# Patient Record
Sex: FEMALE | Race: WHITE | Employment: UNEMPLOYED | URBAN - NONMETROPOLITAN AREA
[De-identification: names, ages, dates, MRNs, and addresses within clinical notes are randomized per-mention and may not be internally consistent; named-entity substitution may affect disease eponyms.]

---

## 2017-01-23 RX ORDER — DIVALPROEX SODIUM 500 MG/1
TABLET, EXTENDED RELEASE ORAL
Qty: 60 TABLET | Refills: 0 | Status: SHIPPED | OUTPATIENT
Start: 2017-01-23 | End: 2017-03-10 | Stop reason: SDUPTHER

## 2017-03-27 RX ORDER — DIVALPROEX SODIUM 500 MG/1
TABLET, EXTENDED RELEASE ORAL
Qty: 60 TABLET | Refills: 0 | Status: SHIPPED | OUTPATIENT
Start: 2017-03-27 | End: 2017-04-20 | Stop reason: SDUPTHER

## 2017-04-20 ENCOUNTER — OFFICE VISIT (OUTPATIENT)
Dept: NEUROLOGY | Age: 37
End: 2017-04-20
Payer: MEDICARE

## 2017-04-20 VITALS
HEIGHT: 64 IN | BODY MASS INDEX: 36.37 KG/M2 | SYSTOLIC BLOOD PRESSURE: 122 MMHG | WEIGHT: 213 LBS | HEART RATE: 84 BPM | DIASTOLIC BLOOD PRESSURE: 78 MMHG

## 2017-04-20 DIAGNOSIS — Z82.0 FH: EPILEPSY: ICD-10-CM

## 2017-04-20 DIAGNOSIS — G40.909 NONINTRACTABLE EPILEPSY WITHOUT STATUS EPILEPTICUS, UNSPECIFIED EPILEPSY TYPE (HCC): ICD-10-CM

## 2017-04-20 DIAGNOSIS — G40.909 SEIZURE DISORDER (HCC): Primary | ICD-10-CM

## 2017-04-20 DIAGNOSIS — Z87.828 OLD HEAD INJURY: ICD-10-CM

## 2017-04-20 DIAGNOSIS — F41.9 ANXIETY: ICD-10-CM

## 2017-04-20 DIAGNOSIS — R94.01 ABNORMAL EEG: ICD-10-CM

## 2017-04-20 DIAGNOSIS — M54.2 CHRONIC NECK PAIN: ICD-10-CM

## 2017-04-20 DIAGNOSIS — R41.3 MEMORY PROBLEM: ICD-10-CM

## 2017-04-20 DIAGNOSIS — R26.89 BALANCE PROBLEM: ICD-10-CM

## 2017-04-20 DIAGNOSIS — J98.4 LUNG DISEASE: ICD-10-CM

## 2017-04-20 DIAGNOSIS — E04.1 THYROID NODULE: ICD-10-CM

## 2017-04-20 DIAGNOSIS — G40.822 NONINTRACTABLE EPILEPTIC SPASMS WITHOUT STATUS EPILEPTICUS (HCC): ICD-10-CM

## 2017-04-20 DIAGNOSIS — F41.0 PANIC DISORDER: ICD-10-CM

## 2017-04-20 DIAGNOSIS — E66.09 NON MORBID OBESITY DUE TO EXCESS CALORIES: ICD-10-CM

## 2017-04-20 DIAGNOSIS — F32.A DEPRESSION, UNSPECIFIED DEPRESSION TYPE: ICD-10-CM

## 2017-04-20 DIAGNOSIS — G89.29 CHRONIC NECK PAIN: ICD-10-CM

## 2017-04-20 DIAGNOSIS — M62.838 MUSCLE SPASMS OF BOTH LOWER EXTREMITIES: ICD-10-CM

## 2017-04-20 DIAGNOSIS — R55 SYNCOPE AND COLLAPSE: ICD-10-CM

## 2017-04-20 PROCEDURE — 99215 OFFICE O/P EST HI 40 MIN: CPT | Performed by: PSYCHIATRY & NEUROLOGY

## 2017-04-20 RX ORDER — ONDANSETRON 4 MG/1
4 TABLET, FILM COATED ORAL
COMMUNITY

## 2017-04-20 RX ORDER — TRAMADOL HYDROCHLORIDE 50 MG/1
50 TABLET ORAL
COMMUNITY

## 2017-04-20 RX ORDER — DULOXETIN HYDROCHLORIDE 20 MG/1
20 CAPSULE, DELAYED RELEASE ORAL
COMMUNITY
Start: 2016-05-20 | End: 2017-05-20

## 2017-04-20 RX ORDER — CYCLOBENZAPRINE HCL 10 MG
10 TABLET ORAL
COMMUNITY

## 2017-04-20 RX ORDER — DIVALPROEX SODIUM 500 MG/1
TABLET, EXTENDED RELEASE ORAL
Qty: 60 TABLET | Refills: 5 | Status: SHIPPED | OUTPATIENT
Start: 2017-04-20 | End: 2018-01-22 | Stop reason: SDUPTHER

## 2017-04-20 RX ORDER — VENLAFAXINE HYDROCHLORIDE 150 MG/1
150 CAPSULE, EXTENDED RELEASE ORAL
COMMUNITY

## 2017-04-20 RX ORDER — ALBUTEROL SULFATE 90 UG/1
2 AEROSOL, METERED RESPIRATORY (INHALATION)
COMMUNITY

## 2017-04-20 RX ORDER — HYDROXYZINE HYDROCHLORIDE 25 MG/1
25 TABLET, FILM COATED ORAL
COMMUNITY

## 2017-04-20 ASSESSMENT — ENCOUNTER SYMPTOMS
CHOKING: 0
APNEA: 0
COLOR CHANGE: 0
ABDOMINAL DISTENTION: 0
CONSTIPATION: 0
BLOOD IN STOOL: 0
EYE ITCHING: 0
CHEST TIGHTNESS: 0
WHEEZING: 0
SINUS PRESSURE: 0
EYE REDNESS: 0
SHORTNESS OF BREATH: 0
PHOTOPHOBIA: 0
VOICE CHANGE: 0
DIARRHEA: 0
EYE PAIN: 0
FACIAL SWELLING: 0
BACK PAIN: 1
TROUBLE SWALLOWING: 0
EYE DISCHARGE: 0

## 2018-01-22 ENCOUNTER — OFFICE VISIT (OUTPATIENT)
Dept: NEUROLOGY | Age: 38
End: 2018-01-22
Payer: MEDICAID

## 2018-01-22 VITALS
BODY MASS INDEX: 36.36 KG/M2 | HEIGHT: 64 IN | SYSTOLIC BLOOD PRESSURE: 130 MMHG | WEIGHT: 212.96 LBS | DIASTOLIC BLOOD PRESSURE: 70 MMHG | OXYGEN SATURATION: 98 % | HEART RATE: 96 BPM

## 2018-01-22 DIAGNOSIS — F41.9 ANXIETY: ICD-10-CM

## 2018-01-22 DIAGNOSIS — Z87.828 OLD HEAD INJURY: ICD-10-CM

## 2018-01-22 DIAGNOSIS — Z82.0 FH: EPILEPSY: ICD-10-CM

## 2018-01-22 DIAGNOSIS — F32.A DEPRESSION, UNSPECIFIED DEPRESSION TYPE: ICD-10-CM

## 2018-01-22 DIAGNOSIS — G40.909 SEIZURE DISORDER (HCC): ICD-10-CM

## 2018-01-22 DIAGNOSIS — F41.0 PANIC DISORDER: ICD-10-CM

## 2018-01-22 DIAGNOSIS — R94.01 ABNORMAL EEG: ICD-10-CM

## 2018-01-22 DIAGNOSIS — M62.838 MUSCLE SPASMS OF BOTH LOWER EXTREMITIES: ICD-10-CM

## 2018-01-22 DIAGNOSIS — G89.29 CHRONIC NECK PAIN: ICD-10-CM

## 2018-01-22 DIAGNOSIS — G40.309 NONINTRACTABLE GENERALIZED IDIOPATHIC EPILEPSY WITHOUT STATUS EPILEPTICUS (HCC): Primary | ICD-10-CM

## 2018-01-22 DIAGNOSIS — M54.2 CHRONIC NECK PAIN: ICD-10-CM

## 2018-01-22 DIAGNOSIS — E04.1 THYROID NODULE: ICD-10-CM

## 2018-01-22 DIAGNOSIS — R41.3 MEMORY PROBLEM: ICD-10-CM

## 2018-01-22 DIAGNOSIS — E66.09 NON MORBID OBESITY DUE TO EXCESS CALORIES: ICD-10-CM

## 2018-01-22 DIAGNOSIS — R26.89 BALANCE PROBLEM: ICD-10-CM

## 2018-01-22 DIAGNOSIS — E66.09 CLASS 1 OBESITY DUE TO EXCESS CALORIES WITH SERIOUS COMORBIDITY IN ADULT, UNSPECIFIED BMI: ICD-10-CM

## 2018-01-22 PROCEDURE — 99215 OFFICE O/P EST HI 40 MIN: CPT | Performed by: PSYCHIATRY & NEUROLOGY

## 2018-01-22 RX ORDER — DIVALPROEX SODIUM 500 MG/1
TABLET, EXTENDED RELEASE ORAL
Qty: 60 TABLET | Refills: 5 | Status: SHIPPED | OUTPATIENT
Start: 2018-01-22

## 2018-01-22 ASSESSMENT — ENCOUNTER SYMPTOMS
EYE REDNESS: 0
SINUS PRESSURE: 0
WHEEZING: 0
VOMITING: 0
CHEST TIGHTNESS: 0
PHOTOPHOBIA: 0
SHORTNESS OF BREATH: 0
SWOLLEN GLANDS: 0
VISUAL CHANGE: 0
EYE DISCHARGE: 0
ABDOMINAL PAIN: 0
COLOR CHANGE: 0
BLOOD IN STOOL: 0
TROUBLE SWALLOWING: 0
CHANGE IN BOWEL HABIT: 0
FACIAL SWELLING: 0
CONSTIPATION: 0
EYE PAIN: 0
BACK PAIN: 1
EYE ITCHING: 0
COUGH: 0
NAUSEA: 0
APNEA: 0
CHOKING: 0
SORE THROAT: 0
DIARRHEA: 0
ABDOMINAL DISTENTION: 0
VOICE CHANGE: 0

## 2018-01-22 NOTE — PROGRESS NOTES
records         INFORMATION   REVIEWED:     MEDICAL   HISTORY,     SURGICAL   HISTORY,   MEDICATIONS   LIST,   ALLERGIES AND  DRUG  INTOLERANCES,     FAMILY   HISTORY,  SOCIAL  HISTORY,    PROBLEM  LIST   FOR  PATIENT  CARE   COORDINATION         Past Medical History:   Diagnosis Date    Anxiety     Chronic neck pain     Depression     Lung disease     Panic disorder     Thyroid nodule                 History reviewed. No pertinent surgical history.               Current Outpatient Prescriptions   Medication Sig Dispense Refill    divalproex (DEPAKOTE ER) 500 MG extended release tablet TAKE ONE TABLET BY MOUTH TWICE DAILY 60 tablet 5    albuterol sulfate  (90 BASE) MCG/ACT inhaler Inhale 2 puffs into the lungs      venlafaxine (EFFEXOR XR) 150 MG extended release capsule Take 150 mg by mouth      fluticasone (FLOVENT HFA) 110 MCG/ACT inhaler Inhale 1 puff into the lungs 2 times daily      levothyroxine (SYNTHROID) 50 MCG tablet Take 50 mcg by mouth Daily      meloxicam (MOBIC) 15 MG tablet Take 15 mg by mouth daily      etonogestrel (NEXPLANON) 68 MG implant Inject 68 mg into the skin once      traZODone (DESYREL) 50 MG tablet Take 50 mg by mouth nightly      cyclobenzaprine (FLEXERIL) 10 MG tablet Take 10 mg by mouth      DULoxetine (CYMBALTA) 20 MG extended release capsule Take 20 mg by mouth      hydrOXYzine (ATARAX) 25 MG tablet Take 25 mg by mouth      ondansetron (ZOFRAN) 4 MG tablet Take 4 mg by mouth      traMADol (ULTRAM) 50 MG tablet Take 50 mg by mouth      ALPRAZolam (XANAX) 0.5 MG tablet Take 0.5 mg by mouth nightly as needed for Sleep or Anxiety      baclofen (LIORESAL) 10 MG tablet Take 10 mg by mouth 3 times daily      folic acid (FOLVITE) 841 MCG tablet Take 400 mcg by mouth daily      melatonin 3 MG TABS tablet Take 5 mg by mouth daily      ondansetron (ZOFRAN-ODT) 4 MG disintegrating tablet Take 4 mg by mouth every 8 hours as needed for Nausea or Vomiting      Were   Discussed             * Need   For  Further   Follow up For  The  Various  Problems  Were discussed. * Results  Of  The  Previous  Diagnostic tests were reviewed and questions answered. patient  understand the same. Medical  Decision  Making  Was  Made  Based on the   Complexity  Of  Above  Mentioned  Diagnoses,    Data reviewed   & diagnostic  Tests  Reviewed,  Risk  Of  Significant   Co morbidities and complicating   Factors. Medical  Decision  Was   High  Complexity  Due   To  The  Patient's  Multiple  Symptoms,  Advancing   Disease,  Complex  Treatment  Regimen,  Multiple medications and   Risk  Of   Side  Effects,  Difficulty  In  Medication  Management  And  Diagnostic  Challenges   In  View  Of  The  Associated   Co  Morbid  Conditions   And  Problems. *   ABSOLUTELY   NO  DRIVING      *   BE  CAREFUL  WITH  ACTIVITIES          *   ADEQUATE   FLUID  INTAKE   AND  ELECTROLYTE  BALANCE         * KEEP  DAIRY  OF   THE  NEUROLOGICAL  SYMPTOMS      RECORDING THE    DURATION  AND  FREQUENCY. *  AVOID    CONDITIONS  AND  FACTORS   THAT  MAKE   NEUROLOGICAL  SYMPTOMS  WORSE. *   SEIZURE  PRECAUTIONS. A)  Avoid  Working  At   [employer] B)  Avoid  Working  With  Heavy machinery. C)  Avoid   Swimming,  Climbing  A  Ladder   Unattended. D)  Avoid   Driving   If  You   Have  A  Seizure. E)  Must   Be  Seizure  Free   For  At   Least   6 months,  Before   You  Can drive. F) Some times  Your  May  Feel  Seizure coming  Before  It  Begins. You  May feel             Strange smell or funny  Feeling  In  Your  Stomach,  Which is  Called   Aura. TIPS  TO  REDUCE/ PREVENT  SEIZURES         1. Take  Your  Anti seizure  Medications   As   Recommended.      2. Get   Enough Sleep. Sleep  Deprivation   Can  Trigger  A  Seizure. 3. If   You   Have  A fever,  Treat  It  At  Once,  And  Contact   Your  Primary  Care Providers. 4. Avoid   Alcohol. 5. Avoid  Flashing  Lights,  Loud  Noises and  TV  And  Video  Games,           As   These  May  Trigger   Your  Seizures     6. Control  Your  Stress  And   Have  Adequate  Rest.     7.   If  You  Feel  A  Seizure  Coming   On :           A) warn people  Who  Are  With  You           B)  Make  Sure  There  Are  No  Sharp or  Hard  Objects  Around you. C)  Lay down  On  Your  Side  And  Relax. *  TO  MAINTAIN  REGULAR  SLEEP  WAKE  CYCLES. *   TO  HAVE  ADEQUATE  REST  AND   SLEEP    HOURS.    *    AVOID  ANY USAGE OF                 TOBACCO,  EXCESSIVE  ALCOHOL  AND   ILLEGAL   SUBSTANCES      *  CONTINUE MEDICATIONS PRESCRIBED BY NEUROLOGIST AS    RECOMMENDED     *   Compliance   With  Medications   And  Instructions      * CURRENTLY  TOLERATING  THE  PRESCRIBED   MEDICATIONS. WITHOUT  ANY  SIGNIFICANT  SIDE  EFFECTS   &  GETTING BENEFIT. *   CURRENTLY  PATIENT  DENIES  BEING  PREGNANT                AND   HAS  NO  PLANS  TO  GET  PREGNANT. *    SEIZURE   DAIRY   WITH  MONITORING  OF  DURATION  AND  FREQUENCY.         *  May   Use  Pill  Box,    If  Needed      *  MEDICATIONS TO AVOID:    Jenna Route      *    To  Continue  The    Antiplatelet  therapy    As   Recommended  Was   Discussed      *    Prophylactic  Use   Of     Vitamin   B   Complex,  Folic  Acid,    Vitamin  B12    Multivitamin,  Calcium  With  Magnesium,  Vit D    Supplementations   Over  The  Counter  Discussed             Orders Placed This Encounter   Procedures    CBC    Electrolyte Panel    Valproic acid level, total    EEG       Orders Placed This Encounter   Medications    snack instead of a candy bar. Try to have fruits and/or vegetables at every meal.  Make exercise part of your daily routine. You may want to start with simple activities, such as walking, bicycling, or slow swimming. Try to be active 30 to 60 minutes every day. You do not need to do all 30 to 60 minutes all at once. For example, you can exercise 3 times a day for 10 or 20 minutes. Moderate exercise is safe for most people, but it is always a good idea to talk to your doctor before starting an exercise program.  Keep moving. [employer] Needs the lawn, work in the garden, or [employer] ([employer]). Take the stairs instead of the elevator at work. If you smoke, quit. People who smoke have an increased risk for heart attack, stroke, cancer, and other lung illnesses. Quitting is hard, but there are ways to boost your chance of quitting tobacco for good. Use nicotine gum, patches, or lozenges. Ask your doctor about stop-smoking programs and medicines. Keep trying. In addition to reducing your risk of diseases in the future, you will notice some benefits soon after you stop using tobacco. If you have shortness of breath or asthma symptoms, they will likely get better within a few weeks after you quit. Limit how much alcohol you drink. Moderate amounts of alcohol (up to 2 drinks a day for men, 1 drink a day for women) are okay. But drinking too much can lead to liver problems, high blood pressure, and other health problems. Family health  If you have a family, there are many things you can do together to improve your health. Eat meals together as a family as often as possible. Eat healthy foods. This includes fruits, vegetables, lean meats and dairy, and whole grains. Include your family in your fitness plan. Most people think of activities such as jogging or tennis as the way to fitness, but there are many ways you and your family can be more active. Anything that makes you breathe hard and gets your heart pumping is exercise.  Here are some tips:  Walk to do errands or to take your child to school or the bus. Go for a family bike ride after dinner instead of watching TV. Where can you learn more? Go to https://Sign[employer].healthsli.do. org and sign in to your [employer] account. Enter Q416 in the [facility] box to learn more about \"A Healthy Lifestyle: Care Instructions. \"     If you do not have an account, please click on the \"Sign Up Now\" link. Current as of: July 26, 2016  Content Version: 11.2  © 7962-6316 [employer]. Care instructions adapted under license by [facility] ([facility]). If you have questions about a medical condition or this instruction, always ask your healthcare professional. [address] any warranty or liability for your use of this information.

## 2018-01-22 NOTE — PATIENT INSTRUCTIONS
[facility] NEUROLOGY    Due to the complex nature of our neurological testing, It is the policy of the Neurology Department not to release the results of your testing over the phone. Once all testing is completed and we have all the results back, Dr. Hannah Hodgson will then personally go over all the results with you and answer any questions that you may have during a follow up appointment. If you are unable to keep this appointment, please notify the [address] [place] [address] @ [phone], as soon as possible. Please bring your prescription bottles to all appointments. SEIZURE PRECAUTIONS. A) Avoid Working At [employer] B) Avoid Working With Heavy machinery. C) Avoid Swimming, Climbing A Ladder Unattended. D) Avoid Driving If You Have A Seizure. E) Must Be Seizure Free For At Least 6 months, Before You Can drive. F) Some times Your May Feel Seizure coming Before It Begins. You May feel  Strange smell or funny Feeling In Your Stomach, Which is Called Aura. TIPS TO REDUCE/ PREVENT SEIZURES   1. Take Your Anti seizure Medications As Recommended. 2. Get Enough Sleep. Sleep Deprivation Can Trigger A Seizure. 3. If You Have A fever, Treat It At Once, And Contact Your Primary Care Providers. 4. Avoid Alcohol. 5. Avoid Flashing Lights, Loud Noises and TV And Video Games,   As These May Trigger Your Seizures  6. Control Your Stress And Have Adequate Rest.  7. If You Feel A Seizure Coming On :  A) warn people Who Are With You  B) Make Sure There Are No Sharp or Hard Objects Around you. C) Lay down On Your Side And Relax.